# Patient Record
Sex: MALE | Race: BLACK OR AFRICAN AMERICAN
[De-identification: names, ages, dates, MRNs, and addresses within clinical notes are randomized per-mention and may not be internally consistent; named-entity substitution may affect disease eponyms.]

---

## 2018-01-29 ENCOUNTER — HOSPITAL ENCOUNTER (EMERGENCY)
Dept: HOSPITAL 17 - NEPK | Age: 38
Discharge: HOME | End: 2018-01-29
Payer: MEDICARE

## 2018-01-29 VITALS — BODY MASS INDEX: 29.26 KG/M2 | HEIGHT: 72 IN | WEIGHT: 216.05 LBS

## 2018-01-29 VITALS
SYSTOLIC BLOOD PRESSURE: 146 MMHG | DIASTOLIC BLOOD PRESSURE: 94 MMHG | TEMPERATURE: 98.6 F | HEART RATE: 74 BPM | OXYGEN SATURATION: 100 % | RESPIRATION RATE: 16 BRPM

## 2018-01-29 DIAGNOSIS — J32.1: Primary | ICD-10-CM

## 2018-01-29 PROCEDURE — 99283 EMERGENCY DEPT VISIT LOW MDM: CPT

## 2018-01-29 NOTE — PD
HPI


Chief Complaint:  Nosebleed


Time Seen by Provider:  10:20


Travel History


International Travel<30 days:  No


Contact w/Intl Traveler<30days:  No


Traveled to known affect area:  No





History of Present Illness


HPI


This is a 37-year-old male here for evaluation of intermittent nosebleed 1 

week.  Patient denies fever, headache, head trauma/facial trauma.  He is not 

anticoagulated.  No history of bleeding or clotting disorders.  No bleeding 

gums or excessive unexplained bruising.  He reports he's having intermittent 

right naris bleeding.  He reports "blood clots" from his right naris when he 

blows his nose each morning.  No active bleeding.  Symptoms severity is mild.  

No aggravating or alleviating factors.





PFSH


Past Medical History


Medical History:  Denies Significant Hx





Social History


Tobacco Use:  No





Review of Systems


Except as stated in HPI:  all other systems reviewed are Neg


HENT:  Positive: Congestion


Cardiovascular:  No: Chest Pain or Discomfort


Respiratory:  No: Shortness of Breath


Gastrointestinal:  No: Abdominal Pain





Physical Exam


Narrative


GENERAL: Alert and well-appearing 37 old male


SKIN: Warm and dry.  No areas of ecchymosis.  No rash.


HEAD: Normocephalic.


EYES: No scleral icterus.


Ears/nose/throat: Tenderness over the right frontal and maxillary sinuses.  No 

bleeding from either naris.  No septal hematoma or perforation.  No blood in 

the posterior oropharynx.


NECK: Supple, trachea midline. No lymphadenopathy.











Data


Data


Last Documented VS





Vital Signs








  Date Time  Temp Pulse Resp B/P (MAP) Pulse Ox O2 Delivery O2 Flow Rate FiO2


 


1/29/18 09:57 98.6 74 16 146/94 (111) 100 Room Air  











MDM


Medical Decision Making


Medical Screen Exam Complete:  Yes


Emergency Medical Condition:  Yes


Differential Diagnosis


Epistaxis, sinusitis, other


Narrative Course


This is a 37-year-old male here with reported mild intermittent nosebleed 1 

week.  His physical exam revealed no active bleeding.  He does have some mild 

tenderness to the front maxillary and frontal sinus.  He will be treated for 

sinusitis and to follow-up with ENT.





Diagnosis





 Primary Impression:  


 Sinusitis


 Qualified Codes:  J32.1 - Chronic frontal sinusitis


Referrals:  


Primary Care Physician





***Additional Instructions:  


Medications as prescribed.


Follow-up with ear nose and throat doctor


Scripts


Amoxicillin-Clavulanate (Augmentin) 875-125 Mg Tab


1 TAB PO BID for Infection, #20 TAB 0 Refills


   Prov: Yamini Sam         1/29/18


Disposition:  01 DISCHARGE HOME


Condition:  Stable











Yamini Sam Jan 29, 2018 10:46